# Patient Record
Sex: MALE | Race: WHITE
[De-identification: names, ages, dates, MRNs, and addresses within clinical notes are randomized per-mention and may not be internally consistent; named-entity substitution may affect disease eponyms.]

---

## 2021-05-07 ENCOUNTER — NURSE TRIAGE (OUTPATIENT)
Dept: OTHER | Facility: CLINIC | Age: 26
End: 2021-05-07

## 2021-05-07 NOTE — TELEPHONE ENCOUNTER
Reason for Disposition   SEVERE pain (e.g., excruciating, unable to do any normal activities)    Answer Assessment - Initial Assessment Questions  1. ONSET: \"When did the pain start?\"   1 week ago      2. LOCATION: \"Where is the pain located? \"      Left ball of foot (bottom)    3. PAIN: \"How bad is the pain? \"    (Scale 1-10; or mild, moderate, severe)    -  MILD (1-3): doesn't interfere with normal activities     -  MODERATE (4-7): interferes with normal activities (e.g., work or school) or awakens from sleep, limping     -  SEVERE (8-10): excruciating pain, unable to do any normal activities, unable to walk  Severe    4. WORK OR EXERCISE: \"Has there been any recent work or exercise that involved this part of the body? \"    hurt it 1 month ago, jumped in a pool with metal cleats on while working    5. CAUSE: \"What do you think is causing the foot pain? \"   recent injury possible    6. OTHER SYMPTOMS: \"Do you have any other symptoms? \" (e.g., leg pain, rash, fever, numbness)    No    Protocols used: FOOT PAIN-ADULT-[place]    Caller has health insurance questions. Number given, then call transferred to [facility] customer service, [phone]. Wanted to also know urgent care coverage in his area    Brief description of triage: foot pain    Triage indicates for patient to be seen today at urgent care states pcp couldn't see him for 2 weeks. Care advice provided, patient verbalizes understanding; denies any other questions or concerns; instructed to call back for any new or worsening symptoms. This triage is a result of a call to [address] [place] [address]. Please do not respond to the triage nurse through this encounter. Any subsequent communication should be directly with the patient.